# Patient Record
Sex: FEMALE | URBAN - METROPOLITAN AREA
[De-identification: names, ages, dates, MRNs, and addresses within clinical notes are randomized per-mention and may not be internally consistent; named-entity substitution may affect disease eponyms.]

---

## 2020-09-21 ENCOUNTER — OFFICE VISIT (OUTPATIENT)
Dept: URBAN - METROPOLITAN AREA CLINIC 115 | Facility: CLINIC | Age: 60
End: 2020-09-21

## 2020-10-07 ENCOUNTER — TELEPHONE ENCOUNTER (OUTPATIENT)
Dept: URBAN - METROPOLITAN AREA CLINIC 92 | Facility: CLINIC | Age: 60
End: 2020-10-07

## 2020-11-20 ENCOUNTER — LAB OUTSIDE AN ENCOUNTER (OUTPATIENT)
Dept: URBAN - METROPOLITAN AREA TELEHEALTH 2 | Facility: TELEHEALTH | Age: 60
End: 2020-11-20

## 2020-11-20 ENCOUNTER — OFFICE VISIT (OUTPATIENT)
Dept: URBAN - METROPOLITAN AREA TELEHEALTH 2 | Facility: TELEHEALTH | Age: 60
End: 2020-11-20
Payer: COMMERCIAL

## 2020-11-20 VITALS — HEIGHT: 61 IN | WEIGHT: 149 LBS | BODY MASS INDEX: 28.13 KG/M2

## 2020-11-20 DIAGNOSIS — K59.09 CHRONIC CONSTIPATION: ICD-10-CM

## 2020-11-20 DIAGNOSIS — R19.8 FULLNESS OF ABDOMEN: ICD-10-CM

## 2020-11-20 DIAGNOSIS — K83.8 DILATED BILE DUCT: ICD-10-CM

## 2020-11-20 DIAGNOSIS — R10.13 EPIGASTRIC ABDOMINAL PAIN: ICD-10-CM

## 2020-11-20 DIAGNOSIS — M54.5 LOW BACK PAIN: ICD-10-CM

## 2020-11-20 DIAGNOSIS — G89.29 OTHER CHRONIC PAIN: ICD-10-CM

## 2020-11-20 PROBLEM — 123608004: Status: ACTIVE | Noted: 2020-11-20

## 2020-11-20 PROBLEM — 82423001: Status: ACTIVE | Noted: 2020-11-20

## 2020-11-20 PROCEDURE — G8482 FLU IMMUNIZE ORDER/ADMIN: HCPCS | Performed by: INTERNAL MEDICINE

## 2020-11-20 PROCEDURE — 1036F TOBACCO NON-USER: CPT | Performed by: INTERNAL MEDICINE

## 2020-11-20 PROCEDURE — 3017F COLORECTAL CA SCREEN DOC REV: CPT | Performed by: INTERNAL MEDICINE

## 2020-11-20 PROCEDURE — 99214 OFFICE O/P EST MOD 30 MIN: CPT | Performed by: INTERNAL MEDICINE

## 2020-11-20 PROCEDURE — G9903 PT SCRN TBCO ID AS NON USER: HCPCS | Performed by: INTERNAL MEDICINE

## 2020-11-20 PROCEDURE — G8417 CALC BMI ABV UP PARAM F/U: HCPCS | Performed by: INTERNAL MEDICINE

## 2020-11-20 PROCEDURE — G8427 DOCREV CUR MEDS BY ELIG CLIN: HCPCS | Performed by: INTERNAL MEDICINE

## 2020-11-20 RX ORDER — GLIMEPIRIDE 2 MG/1
TABLET ORAL
Qty: 0 | Refills: 0 | Status: ON HOLD | COMMUNITY
Start: 1900-01-01

## 2020-11-20 RX ORDER — METFORMIN HYDROCHLORIDE 1000 MG/1
TABLET, COATED ORAL
Qty: 0 | Refills: 0 | Status: ACTIVE | COMMUNITY
Start: 1900-01-01

## 2020-11-20 RX ORDER — MONTELUKAST SODIUM 10 MG/1
TAKE 1 TABLET (10 MG) BY ORAL ROUTE ONCE DAILY IN THE EVENING TABLET, FILM COATED ORAL 1
Qty: 0 | Refills: 0 | Status: ACTIVE | COMMUNITY
Start: 1900-01-01

## 2020-11-20 RX ORDER — PANTOPRAZOLE SODIUM 40 MG/1
TABLET, DELAYED RELEASE ORAL
Qty: 0 | Refills: 0 | Status: ACTIVE | COMMUNITY
Start: 1900-01-01

## 2020-11-20 RX ORDER — BRIMONIDINE TARTRATE, TIMOLOL MALEATE 2; 5 MG/ML; MG/ML
SOLUTION/ DROPS OPHTHALMIC
Qty: 0 | Refills: 0 | Status: ACTIVE | COMMUNITY
Start: 1900-01-01

## 2020-11-20 RX ORDER — SERTRALINE 50 MG/1
TABLET, FILM COATED ORAL
Qty: 0 | Refills: 0 | Status: ACTIVE | COMMUNITY
Start: 1900-01-01

## 2020-11-20 RX ORDER — BIMATOPROST 0.1 MG/ML
SOLUTION/ DROPS OPHTHALMIC
Qty: 1 | Refills: 0 | Status: ACTIVE | COMMUNITY
Start: 1900-01-01

## 2020-11-20 RX ORDER — CARISOPRODOL 350 MG/1
TABLET ORAL
Qty: 0 | Refills: 0 | Status: ACTIVE | COMMUNITY
Start: 1900-01-01

## 2020-11-20 RX ORDER — AMBRISENTAN 5 MG/1
TABLET, FILM COATED ORAL
Qty: 0 | Refills: 0 | Status: ACTIVE | COMMUNITY
Start: 1900-01-01

## 2020-11-20 RX ORDER — PRAVASTATIN SODIUM 40 MG/1
TABLET ORAL
Qty: 0 | Refills: 0 | Status: ACTIVE | COMMUNITY
Start: 1900-01-01

## 2020-11-20 RX ORDER — LOSARTAN POTASSIUM 25 MG/1
TABLET, FILM COATED ORAL
Qty: 0 | Refills: 0 | Status: ON HOLD | COMMUNITY
Start: 1900-01-01

## 2020-11-20 RX ORDER — SILDENAFIL 25 MG/1
TABLET, FILM COATED ORAL
Qty: 0 | Refills: 0 | Status: ACTIVE | COMMUNITY
Start: 1900-01-01

## 2020-11-20 RX ORDER — PREDNISONE 5 MG/1
TABLET ORAL
Qty: 0 | Refills: 0 | Status: ACTIVE | COMMUNITY
Start: 1900-01-01

## 2020-11-20 RX ORDER — LINACLOTIDE 145 UG/1
1 CAPSULE AT LEAST 30 MINUTES BEFORE THE FIRST MEAL OF THE DAY ON AN EMPTY STOMACH CAPSULE, GELATIN COATED ORAL ONCE A DAY
Qty: 90 | Refills: 3 | OUTPATIENT

## 2020-11-20 NOTE — HPI-OTHER HISTORIES
59 y/o female presents for follow up of pressure at top of stomach, from naval up.  When moves or walks up stairs is very painful.  Passing gas and having BM.  Having loose stools daily.    Went to pulmonologist, he gave her probiotics and citracel.  This helped her move her bowels but epigastric pressure removes.  1.5 months ago went to urgent care, xray shows there was alot of stool.   Now moving bowels daily, but "diarrhea".  She notices pain when she walks up stairs. She is employed as a .  When she has to lift bags of dog food or other heavy items it will hurt her abdomen and back more.  Takes Soma for muscle spasms.  But lately her ack hurts as well.  Had CT 2 weeks ago, says her  said it was OK.  Did try LInzess a couple years ago, seem to recall it did help her.

## 2020-12-03 ENCOUNTER — OFFICE VISIT (OUTPATIENT)
Dept: URBAN - METROPOLITAN AREA SURGERY CENTER 13 | Facility: SURGERY CENTER | Age: 60
End: 2020-12-03
Payer: COMMERCIAL

## 2020-12-03 DIAGNOSIS — K29.60 ADENOPAPILLOMATOSIS GASTRICA: ICD-10-CM

## 2020-12-03 DIAGNOSIS — R10.13 ABDOMINAL DISCOMFORT, EPIGASTRIC: ICD-10-CM

## 2020-12-03 DIAGNOSIS — D13.1 ADENOMATOUS POLYP OF STOMACH: ICD-10-CM

## 2020-12-03 DIAGNOSIS — K29.80 ACUTE DUODENITIS: ICD-10-CM

## 2020-12-03 PROCEDURE — 43251 EGD REMOVE LESION SNARE: CPT | Performed by: INTERNAL MEDICINE

## 2020-12-03 PROCEDURE — 45378 DIAGNOSTIC COLONOSCOPY: CPT | Performed by: INTERNAL MEDICINE

## 2020-12-03 PROCEDURE — G9937 DIG OR SURV COLSCO: HCPCS | Performed by: INTERNAL MEDICINE

## 2020-12-03 PROCEDURE — G8907 PT DOC NO EVENTS ON DISCHARG: HCPCS | Performed by: INTERNAL MEDICINE

## 2020-12-03 PROCEDURE — 43239 EGD BIOPSY SINGLE/MULTIPLE: CPT | Performed by: INTERNAL MEDICINE

## 2020-12-08 ENCOUNTER — TELEPHONE ENCOUNTER (OUTPATIENT)
Dept: URBAN - METROPOLITAN AREA CLINIC 92 | Facility: CLINIC | Age: 60
End: 2020-12-08

## 2020-12-08 RX ORDER — SUCRALFATE 1 G/1
TAKE 1 TABLET BY ORAL ROUTE 4 TIMES A DAY FOR 30 DAYS TABLET ORAL QID
Qty: 112 TABLETS | Refills: 0 | OUTPATIENT
Start: 2020-12-08 | End: 2021-01-05

## 2020-12-08 RX ORDER — AMBRISENTAN 5 MG/1
TABLET, FILM COATED ORAL
Qty: 0 | Refills: 0 | Status: ACTIVE | COMMUNITY
Start: 1900-01-01

## 2020-12-08 RX ORDER — MONTELUKAST SODIUM 10 MG/1
TAKE 1 TABLET (10 MG) BY ORAL ROUTE ONCE DAILY IN THE EVENING TABLET, FILM COATED ORAL 1
Qty: 0 | Refills: 0 | Status: ACTIVE | COMMUNITY
Start: 1900-01-01

## 2020-12-08 RX ORDER — CARISOPRODOL 350 MG/1
TABLET ORAL
Qty: 0 | Refills: 0 | Status: ACTIVE | COMMUNITY
Start: 1900-01-01

## 2020-12-08 RX ORDER — PREDNISONE 5 MG/1
TABLET ORAL
Qty: 0 | Refills: 0 | Status: ACTIVE | COMMUNITY
Start: 1900-01-01

## 2020-12-08 RX ORDER — GLIMEPIRIDE 2 MG/1
TABLET ORAL
Qty: 0 | Refills: 0 | Status: ON HOLD | COMMUNITY
Start: 1900-01-01

## 2020-12-08 RX ORDER — METFORMIN HYDROCHLORIDE 1000 MG/1
TABLET, COATED ORAL
Qty: 0 | Refills: 0 | Status: ACTIVE | COMMUNITY
Start: 1900-01-01

## 2020-12-08 RX ORDER — LINACLOTIDE 145 UG/1
1 CAPSULE AT LEAST 30 MINUTES BEFORE THE FIRST MEAL OF THE DAY ON AN EMPTY STOMACH CAPSULE, GELATIN COATED ORAL ONCE A DAY
Qty: 90 | Refills: 3 | Status: ACTIVE | COMMUNITY

## 2020-12-08 RX ORDER — LOSARTAN POTASSIUM 25 MG/1
TABLET, FILM COATED ORAL
Qty: 0 | Refills: 0 | Status: ON HOLD | COMMUNITY
Start: 1900-01-01

## 2020-12-08 RX ORDER — BIMATOPROST 0.1 MG/ML
SOLUTION/ DROPS OPHTHALMIC
Qty: 1 | Refills: 0 | Status: ACTIVE | COMMUNITY
Start: 1900-01-01

## 2020-12-08 RX ORDER — LINACLOTIDE 145 UG/1
1 CAPSULE AT LEAST 30 MINUTES BEFORE THE FIRST MEAL OF THE DAY ON AN EMPTY STOMACH CAPSULE, GELATIN COATED ORAL ONCE A DAY
OUTPATIENT

## 2020-12-08 RX ORDER — BRIMONIDINE TARTRATE, TIMOLOL MALEATE 2; 5 MG/ML; MG/ML
SOLUTION/ DROPS OPHTHALMIC
Qty: 0 | Refills: 0 | Status: ACTIVE | COMMUNITY
Start: 1900-01-01

## 2020-12-08 RX ORDER — PANTOPRAZOLE SODIUM 40 MG/1
TABLET, DELAYED RELEASE ORAL
Qty: 0 | Refills: 0 | Status: ACTIVE | COMMUNITY
Start: 1900-01-01

## 2020-12-08 RX ORDER — PRAVASTATIN SODIUM 40 MG/1
TABLET ORAL
Qty: 0 | Refills: 0 | Status: ACTIVE | COMMUNITY
Start: 1900-01-01

## 2020-12-08 RX ORDER — SILDENAFIL 25 MG/1
TABLET, FILM COATED ORAL
Qty: 0 | Refills: 0 | Status: ACTIVE | COMMUNITY
Start: 1900-01-01

## 2020-12-08 RX ORDER — SERTRALINE 50 MG/1
TABLET, FILM COATED ORAL
Qty: 0 | Refills: 0 | Status: ACTIVE | COMMUNITY
Start: 1900-01-01

## 2020-12-31 ENCOUNTER — LAB OUTSIDE AN ENCOUNTER (OUTPATIENT)
Dept: URBAN - METROPOLITAN AREA CLINIC 92 | Facility: CLINIC | Age: 60
End: 2020-12-31

## 2020-12-31 ENCOUNTER — TELEPHONE ENCOUNTER (OUTPATIENT)
Dept: URBAN - METROPOLITAN AREA CLINIC 92 | Facility: CLINIC | Age: 60
End: 2020-12-31

## 2021-01-22 ENCOUNTER — TELEPHONE ENCOUNTER (OUTPATIENT)
Dept: URBAN - METROPOLITAN AREA CLINIC 82 | Facility: CLINIC | Age: 61
End: 2021-01-22

## 2021-01-22 RX ORDER — SUCRALFATE 1 G/1
TAKE 1 TABLET BY ORAL ROUTE 4 TIMES A DAY FOR 30 DAYS TABLET ORAL QID
Qty: 112 TABLETS
Start: 2020-12-08

## 2022-01-28 ENCOUNTER — OFFICE VISIT (OUTPATIENT)
Dept: URBAN - METROPOLITAN AREA TELEHEALTH 2 | Facility: TELEHEALTH | Age: 62
End: 2022-01-28

## 2022-03-28 ENCOUNTER — OFFICE VISIT (OUTPATIENT)
Dept: URBAN - METROPOLITAN AREA CLINIC 115 | Facility: CLINIC | Age: 62
End: 2022-03-28

## 2022-05-25 ENCOUNTER — OFFICE VISIT (OUTPATIENT)
Dept: URBAN - METROPOLITAN AREA CLINIC 82 | Facility: CLINIC | Age: 62
End: 2022-05-25

## 2022-05-25 VITALS — HEIGHT: 61 IN

## 2022-12-30 ENCOUNTER — OFFICE VISIT (OUTPATIENT)
Dept: URBAN - METROPOLITAN AREA CLINIC 82 | Facility: CLINIC | Age: 62
End: 2022-12-30

## 2022-12-30 RX ORDER — SERTRALINE 50 MG/1
TABLET, FILM COATED ORAL
Qty: 0 | Refills: 0 | Status: ACTIVE | COMMUNITY
Start: 1900-01-01

## 2022-12-30 RX ORDER — MONTELUKAST SODIUM 10 MG/1
TAKE 1 TABLET (10 MG) BY ORAL ROUTE ONCE DAILY IN THE EVENING TABLET, FILM COATED ORAL 1
Qty: 0 | Refills: 0 | Status: ACTIVE | COMMUNITY
Start: 1900-01-01

## 2022-12-30 RX ORDER — PRAVASTATIN SODIUM 40 MG/1
TABLET ORAL
Qty: 0 | Refills: 0 | Status: ACTIVE | COMMUNITY
Start: 1900-01-01

## 2022-12-30 RX ORDER — PANTOPRAZOLE SODIUM 40 MG/1
TABLET, DELAYED RELEASE ORAL
Qty: 0 | Refills: 0 | Status: ACTIVE | COMMUNITY
Start: 1900-01-01

## 2022-12-30 RX ORDER — SILDENAFIL 25 MG/1
TABLET, FILM COATED ORAL
Qty: 0 | Refills: 0 | Status: ACTIVE | COMMUNITY
Start: 1900-01-01

## 2022-12-30 RX ORDER — SUCRALFATE 1 G/1
TAKE 1 TABLET BY ORAL ROUTE 4 TIMES A DAY FOR 30 DAYS TABLET ORAL QID
Qty: 112 TABLETS | Status: ACTIVE | COMMUNITY
Start: 2020-12-08

## 2022-12-30 RX ORDER — GLIMEPIRIDE 2 MG/1
TABLET ORAL
Qty: 0 | Refills: 0 | Status: ON HOLD | COMMUNITY
Start: 1900-01-01

## 2022-12-30 RX ORDER — METFORMIN HYDROCHLORIDE 1000 MG/1
TABLET, COATED ORAL
Qty: 0 | Refills: 0 | Status: ACTIVE | COMMUNITY
Start: 1900-01-01

## 2022-12-30 RX ORDER — BRIMONIDINE TARTRATE, TIMOLOL MALEATE 2; 5 MG/ML; MG/ML
SOLUTION/ DROPS OPHTHALMIC
Qty: 0 | Refills: 0 | Status: ACTIVE | COMMUNITY
Start: 1900-01-01

## 2022-12-30 RX ORDER — CARISOPRODOL 350 MG/1
TABLET ORAL
Qty: 0 | Refills: 0 | Status: ACTIVE | COMMUNITY
Start: 1900-01-01

## 2022-12-30 RX ORDER — AMBRISENTAN 5 MG/1
TABLET, FILM COATED ORAL
Qty: 0 | Refills: 0 | Status: ACTIVE | COMMUNITY
Start: 1900-01-01

## 2022-12-30 RX ORDER — LOSARTAN POTASSIUM 25 MG/1
TABLET, FILM COATED ORAL
Qty: 0 | Refills: 0 | Status: ON HOLD | COMMUNITY
Start: 1900-01-01

## 2022-12-30 RX ORDER — BIMATOPROST 0.1 MG/ML
SOLUTION/ DROPS OPHTHALMIC
Qty: 1 | Refills: 0 | Status: ACTIVE | COMMUNITY
Start: 1900-01-01

## 2022-12-30 RX ORDER — PREDNISONE 5 MG/1
TABLET ORAL
Qty: 0 | Refills: 0 | Status: ACTIVE | COMMUNITY
Start: 1900-01-01

## 2023-12-13 ENCOUNTER — LAB OUTSIDE AN ENCOUNTER (OUTPATIENT)
Dept: URBAN - METROPOLITAN AREA CLINIC 115 | Facility: CLINIC | Age: 63
End: 2023-12-13

## 2023-12-13 ENCOUNTER — OFFICE VISIT (OUTPATIENT)
Dept: URBAN - METROPOLITAN AREA CLINIC 115 | Facility: CLINIC | Age: 63
End: 2023-12-13
Payer: COMMERCIAL

## 2023-12-13 ENCOUNTER — WEB ENCOUNTER (OUTPATIENT)
Dept: URBAN - METROPOLITAN AREA CLINIC 92 | Facility: CLINIC | Age: 63
End: 2023-12-13

## 2023-12-13 VITALS
HEART RATE: 114 BPM | HEIGHT: 61 IN | TEMPERATURE: 98 F | WEIGHT: 102.8 LBS | SYSTOLIC BLOOD PRESSURE: 143 MMHG | DIASTOLIC BLOOD PRESSURE: 84 MMHG | BODY MASS INDEX: 19.41 KG/M2

## 2023-12-13 DIAGNOSIS — K76.0 METABOLIC DYSFUNCTION-ASSOCIATED STEATOTIC LIVER DISEASE (MASLD): ICD-10-CM

## 2023-12-13 DIAGNOSIS — K21.9 GASTROESOPHAGEAL REFLUX DISEASE WITHOUT ESOPHAGITIS: ICD-10-CM

## 2023-12-13 DIAGNOSIS — Z87.898 HISTORY OF HEMOPTYSIS: ICD-10-CM

## 2023-12-13 DIAGNOSIS — R00.0 TACHYCARDIA: ICD-10-CM

## 2023-12-13 DIAGNOSIS — R63.4 WEIGHT LOSS, UNINTENTIONAL: ICD-10-CM

## 2023-12-13 DIAGNOSIS — R10.13 EPIGASTRIC ABDOMINAL PAIN: ICD-10-CM

## 2023-12-13 DIAGNOSIS — K86.89 DILATED PANCREATIC DUCT: ICD-10-CM

## 2023-12-13 PROBLEM — 266435005: Status: ACTIVE | Noted: 2023-12-13

## 2023-12-13 PROBLEM — 197321007: Status: ACTIVE | Noted: 2023-12-13

## 2023-12-13 PROCEDURE — 99204 OFFICE O/P NEW MOD 45 MIN: CPT

## 2023-12-13 RX ORDER — PANTOPRAZOLE SODIUM 40 MG/1
TABLET, DELAYED RELEASE ORAL
Qty: 0 | Refills: 0 | Status: DISCONTINUED | COMMUNITY
Start: 1900-01-01

## 2023-12-13 RX ORDER — SILDENAFIL 25 MG/1
TABLET, FILM COATED ORAL
Qty: 0 | Refills: 0 | Status: DISCONTINUED | COMMUNITY
Start: 1900-01-01

## 2023-12-13 RX ORDER — SILDENAFIL 20 MG/1
1 TABLET TABLET, FILM COATED ORAL ONCE A DAY
Status: ACTIVE | COMMUNITY

## 2023-12-13 RX ORDER — PRAVASTATIN SODIUM 40 MG/1
1 TABLET TABLET ORAL ONCE A DAY
Status: ACTIVE | COMMUNITY

## 2023-12-13 RX ORDER — PREDNISONE 5 MG/1
TABLET ORAL
Qty: 0 | Refills: 0 | Status: DISCONTINUED | COMMUNITY
Start: 1900-01-01

## 2023-12-13 RX ORDER — GLIMEPIRIDE 2 MG/1
TABLET ORAL
Qty: 0 | Refills: 0 | Status: ON HOLD | COMMUNITY
Start: 1900-01-01

## 2023-12-13 RX ORDER — BIMATOPROST 0.1 MG/ML
1 DROP INTO AFFECTED EYE IN THE EVENING SOLUTION/ DROPS OPHTHALMIC ONCE A DAY
Status: ACTIVE | COMMUNITY

## 2023-12-13 RX ORDER — CARISOPRODOL 350 MG/1
TABLET ORAL
Qty: 0 | Refills: 0 | Status: DISCONTINUED | COMMUNITY
Start: 1900-01-01

## 2023-12-13 RX ORDER — SUCRALFATE 1 G/1
TAKE 1 TABLET BY ORAL ROUTE 4 TIMES A DAY FOR 30 DAYS TABLET ORAL QID
Qty: 112 TABLETS | Status: DISCONTINUED | COMMUNITY
Start: 2020-12-08

## 2023-12-13 RX ORDER — PRAVASTATIN SODIUM 40 MG/1
TABLET ORAL
Qty: 0 | Refills: 0 | Status: DISCONTINUED | COMMUNITY
Start: 1900-01-01

## 2023-12-13 RX ORDER — METFORMIN HYDROCHLORIDE 1000 MG/1
TABLET, COATED ORAL
Qty: 0 | Refills: 0 | Status: DISCONTINUED | COMMUNITY
Start: 1900-01-01

## 2023-12-13 RX ORDER — FLUTICASONE FUROATE AND VILANTEROL TRIFENATATE 200; 25 UG/1; UG/1
1 PUFF POWDER RESPIRATORY (INHALATION) ONCE A DAY
Status: ACTIVE | COMMUNITY

## 2023-12-13 RX ORDER — SITAGLIPTIN 100 MG/1
1 TABLET TABLET, FILM COATED ORAL ONCE A DAY
Status: ACTIVE | COMMUNITY

## 2023-12-13 RX ORDER — PREDNISONE 10 MG/1
1 TABLET TABLET ORAL ONCE A DAY
Status: ACTIVE | COMMUNITY

## 2023-12-13 RX ORDER — AMBRISENTAN 5 MG/1
TABLET, FILM COATED ORAL
Qty: 0 | Refills: 0 | Status: DISCONTINUED | COMMUNITY
Start: 1900-01-01

## 2023-12-13 RX ORDER — BRIMONIDINE TARTRATE, TIMOLOL MALEATE 2; 5 MG/ML; MG/ML
SOLUTION/ DROPS OPHTHALMIC
Qty: 0 | Refills: 0 | Status: DISCONTINUED | COMMUNITY
Start: 1900-01-01

## 2023-12-13 RX ORDER — MONTELUKAST SODIUM 10 MG/1
1 TABLET TABLET, FILM COATED ORAL ONCE A DAY
Status: ACTIVE | COMMUNITY

## 2023-12-13 RX ORDER — NEBIVOLOL HYDROCHLORIDE 5 MG/1
1 TABLET TABLET ORAL ONCE A DAY
Status: ACTIVE | COMMUNITY

## 2023-12-13 RX ORDER — IPRATROPIUM BROMIDE AND ALBUTEROL SULFATE .5; 2.5 MG/3ML; MG/3ML
3 ML AS NEEDED SOLUTION RESPIRATORY (INHALATION)
Status: ACTIVE | COMMUNITY

## 2023-12-13 RX ORDER — BRIMONIDINE TARTRATE, TIMOLOL MALEATE 2; 5 MG/ML; MG/ML
1 DROP INTO AFFECTED EYE SOLUTION/ DROPS OPHTHALMIC TWICE A DAY
Status: ACTIVE | COMMUNITY

## 2023-12-13 RX ORDER — BIMATOPROST 0.1 MG/ML
SOLUTION/ DROPS OPHTHALMIC
Qty: 1 | Refills: 0 | Status: DISCONTINUED | COMMUNITY
Start: 1900-01-01

## 2023-12-13 RX ORDER — MONTELUKAST SODIUM 10 MG/1
TAKE 1 TABLET (10 MG) BY ORAL ROUTE ONCE DAILY IN THE EVENING TABLET, FILM COATED ORAL 1
Qty: 0 | Refills: 0 | Status: DISCONTINUED | COMMUNITY
Start: 1900-01-01

## 2023-12-13 RX ORDER — MACITENTAN 10 MG/1
1 TABLET TABLET, FILM COATED ORAL ONCE A DAY
Status: ACTIVE | COMMUNITY

## 2023-12-13 RX ORDER — SERTRALINE 50 MG/1
TABLET, FILM COATED ORAL
Qty: 0 | Refills: 0 | Status: DISCONTINUED | COMMUNITY
Start: 1900-01-01

## 2023-12-13 RX ORDER — LOSARTAN POTASSIUM 25 MG/1
TABLET, FILM COATED ORAL
Qty: 0 | Refills: 0 | Status: ON HOLD | COMMUNITY
Start: 1900-01-01

## 2023-12-13 RX ORDER — CARISOPRODOL 350 MG/1
1 TABLET AS NEEDED TABLET ORAL
Status: ACTIVE | COMMUNITY

## 2023-12-13 NOTE — HPI-TODAY'S VISIT:
64 y/o F with PMH of pulmnary sarcoidosis (Dr. Jorge Luis Reina), aspergilloma, chronic bronchiectasis, chronic hypoxic respiratory failure, severe pulmonary HTN on 2L home O2 PRN presenting in clinic for evaluation of dilated pancreatic duct.  11/2020 CT Abdomen showed: hepatic steatosis, mild distention of the extrahepatic duct, distention of the pancreatic duct likely secondary to prior cholecystectomy. Admits epigastric abdominal pain that relieves with belching/passing gas. Reports bloating, occasional nausea. Was given dicyclomine 20mg to help relieve pain and  Protonix 40mg qd to BID with releif and got CT done in 4/2023 with [facility] (Dr. Coty Ng) that showed the dilated duct again, recommended to get EUS.Reports 8 lbs unintentional weight loss in 2 months, occasional early satiety and night sweats. Denies diet changes, jaundice, icterus, anorexia, vomiting, steatorrhea, blood in stool. Reports occasional constipation but is fairly regular; fluctuates between soft and clumpy formed stools.  States she went to the ER 1 month ago for hematemesis, was worked up and concluded this was due to her lung issues; brochoscopy scheduled tomorrow to assess for cause of bleeding wih Dr. Vidal Lee.  Last colonoscopy: 2020 with Dr. Kennedy showed diverticula and internal hemorrhoids. Next due 2030. 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 Labs in 2020 had ALT 61, AST 51, alkP 95. 2023 labs did not assess LFTs.

## 2023-12-15 LAB
ABSOLUTE BASOPHILS: 28
ABSOLUTE EOSINOPHILS: 202
ABSOLUTE LYMPHOCYTES: 1131
ABSOLUTE MONOCYTES: 756
ABSOLUTE NEUTROPHILS: 3483
ACTIN (SMOOTH MUSCLE) ANTIBODY (IGG): <20
ALBUMIN/GLOBULIN RATIO: 1.1
ALBUMIN: 3.5
ALKALINE PHOSPHATASE: 56
ALT: 8
AST: 13
BASOPHILS: 0.5
BILIRUBIN, TOTAL: 0.4
BUN/CREATININE RATIO: (no result)
CALCIUM: 8.8
CARBON DIOXIDE: 29
CHLORIDE: 107
CREATININE: 0.7
EGFR: 98
EOSINOPHILS: 3.6
FERRITIN, SERUM: 8
FIB 4 INDEX: 1.05
FIB 4 INTERPRETATION: (no result)
GLOBULIN: 3.1
GLUCOSE: 82
HBSAG SCREEN: (no result)
HEMATOCRIT: 28.8
HEMOGLOBIN: 9.1
HEP A AB, IGM: (no result)
HEP B CORE AB, IGM: (no result)
HEPATITIS C ANTIBODY: (no result)
INR: 1
IRON BIND.CAP.(TIBC): 318
IRON SATURATION: 12
IRON: 39
LYMPHOCYTES: 20.2
MCH: 27.8
MCHC: 31.6
MCV: 88.1
MITOCHONDRIAL (M2) ANTIBODY: <=20
MONOCYTES: 13.5
MPV: 9.8
NEUTROPHILS: 62.2
PLATELET COUNT: 271
PLATELET COUNT: 271
POTASSIUM: 3.8
PROTEIN, TOTAL: 6.6
PT: 10.6
RDW: 13.9
RED BLOOD CELL COUNT: 3.27
SODIUM: 144
UREA NITROGEN (BUN): 10
WHITE BLOOD CELL COUNT: 5.6

## 2023-12-18 ENCOUNTER — TELEPHONE ENCOUNTER (OUTPATIENT)
Dept: URBAN - METROPOLITAN AREA CLINIC 115 | Facility: CLINIC | Age: 63
End: 2023-12-18

## 2023-12-18 PROBLEM — 87522002: Status: ACTIVE | Noted: 2023-12-18

## 2023-12-18 RX ORDER — FERROUS SULFATE 325(65) MG
1 TABLET TABLET ORAL ONCE A DAY
Qty: 30 TABLET | Refills: 3 | OUTPATIENT
Start: 2023-12-18

## 2023-12-26 ENCOUNTER — TELEPHONE ENCOUNTER (OUTPATIENT)
Dept: URBAN - METROPOLITAN AREA CLINIC 115 | Facility: CLINIC | Age: 63
End: 2023-12-26

## 2023-12-29 ENCOUNTER — TELEPHONE ENCOUNTER (OUTPATIENT)
Dept: URBAN - METROPOLITAN AREA CLINIC 82 | Facility: CLINIC | Age: 63
End: 2023-12-29

## 2024-01-03 ENCOUNTER — OFFICE VISIT (OUTPATIENT)
Dept: URBAN - METROPOLITAN AREA CLINIC 82 | Facility: CLINIC | Age: 64
End: 2024-01-03

## 2024-01-08 ENCOUNTER — TELEPHONE ENCOUNTER (OUTPATIENT)
Dept: URBAN - METROPOLITAN AREA CLINIC 115 | Facility: CLINIC | Age: 64
End: 2024-01-08

## 2024-01-23 ENCOUNTER — OFFICE VISIT (OUTPATIENT)
Dept: URBAN - METROPOLITAN AREA CLINIC 115 | Facility: CLINIC | Age: 64
End: 2024-01-23
Payer: COMMERCIAL

## 2024-01-23 ENCOUNTER — OFFICE VISIT (OUTPATIENT)
Dept: URBAN - METROPOLITAN AREA CLINIC 115 | Facility: CLINIC | Age: 64
End: 2024-01-23

## 2024-01-23 VITALS
DIASTOLIC BLOOD PRESSURE: 73 MMHG | SYSTOLIC BLOOD PRESSURE: 121 MMHG | HEIGHT: 61 IN | HEART RATE: 103 BPM | TEMPERATURE: 97.8 F | BODY MASS INDEX: 19.48 KG/M2 | WEIGHT: 103.2 LBS

## 2024-01-23 DIAGNOSIS — D50.9 IRON DEFICIENCY ANEMIA, UNSPECIFIED IRON DEFICIENCY ANEMIA TYPE: ICD-10-CM

## 2024-01-23 DIAGNOSIS — R13.19 OTHER DYSPHAGIA: ICD-10-CM

## 2024-01-23 DIAGNOSIS — K21.9 GERD: ICD-10-CM

## 2024-01-23 PROCEDURE — 99213 OFFICE O/P EST LOW 20 MIN: CPT

## 2024-01-23 RX ORDER — CARISOPRODOL 350 MG/1
1 TABLET AS NEEDED TABLET ORAL
Status: ACTIVE | COMMUNITY

## 2024-01-23 RX ORDER — SILDENAFIL 20 MG/1
1 TABLET TABLET, FILM COATED ORAL ONCE A DAY
Status: ACTIVE | COMMUNITY

## 2024-01-23 RX ORDER — MONTELUKAST SODIUM 10 MG/1
1 TABLET TABLET, FILM COATED ORAL ONCE A DAY
Status: ACTIVE | COMMUNITY

## 2024-01-23 RX ORDER — BRIMONIDINE TARTRATE, TIMOLOL MALEATE 2; 5 MG/ML; MG/ML
1 DROP INTO AFFECTED EYE SOLUTION/ DROPS OPHTHALMIC TWICE A DAY
Status: ACTIVE | COMMUNITY

## 2024-01-23 RX ORDER — GLIMEPIRIDE 2 MG/1
TABLET ORAL
Qty: 0 | Refills: 0 | Status: ON HOLD | COMMUNITY
Start: 1900-01-01

## 2024-01-23 RX ORDER — PRAVASTATIN SODIUM 40 MG/1
1 TABLET TABLET ORAL ONCE A DAY
Status: ACTIVE | COMMUNITY

## 2024-01-23 RX ORDER — DICYCLOMINE HYDROCHLORIDE 20 MG/1
1 TABLET TABLET ORAL THREE TIMES A DAY
Status: ON HOLD | COMMUNITY

## 2024-01-23 RX ORDER — LOSARTAN POTASSIUM 25 MG/1
TABLET, FILM COATED ORAL
Qty: 0 | Refills: 0 | Status: ON HOLD | COMMUNITY
Start: 1900-01-01

## 2024-01-23 RX ORDER — BIMATOPROST 0.1 MG/ML
1 DROP INTO AFFECTED EYE IN THE EVENING SOLUTION/ DROPS OPHTHALMIC ONCE A DAY
Status: ACTIVE | COMMUNITY

## 2024-01-23 RX ORDER — MACITENTAN 10 MG/1
1 TABLET TABLET, FILM COATED ORAL ONCE A DAY
Status: ACTIVE | COMMUNITY

## 2024-01-23 RX ORDER — PANTOPRAZOLE SODIUM 40 MG/1
1 TABLET TABLET, DELAYED RELEASE ORAL TWICE A DAY
Qty: 60 TABLET | Refills: 0 | OUTPATIENT
Start: 2024-01-23

## 2024-01-23 RX ORDER — IPRATROPIUM BROMIDE AND ALBUTEROL SULFATE .5; 2.5 MG/3ML; MG/3ML
3 ML AS NEEDED SOLUTION RESPIRATORY (INHALATION)
Status: ACTIVE | COMMUNITY

## 2024-01-23 RX ORDER — FERROUS SULFATE 325(65) MG
1 TABLET TABLET ORAL ONCE A DAY
Qty: 30 TABLET | Refills: 3 | Status: ACTIVE | COMMUNITY
Start: 2023-12-18

## 2024-01-23 RX ORDER — NEBIVOLOL HYDROCHLORIDE 5 MG/1
1 TABLET TABLET ORAL ONCE A DAY
Status: ACTIVE | COMMUNITY

## 2024-01-23 RX ORDER — FLUTICASONE FUROATE AND VILANTEROL TRIFENATATE 200; 25 UG/1; UG/1
1 PUFF POWDER RESPIRATORY (INHALATION) ONCE A DAY
Status: ACTIVE | COMMUNITY

## 2024-01-23 RX ORDER — SITAGLIPTIN 100 MG/1
1 TABLET TABLET, FILM COATED ORAL ONCE A DAY
Status: ACTIVE | COMMUNITY

## 2024-01-23 RX ORDER — PREDNISONE 10 MG/1
1 TABLET TABLET ORAL ONCE A DAY
Status: ACTIVE | COMMUNITY

## 2024-01-23 NOTE — HPI-TODAY'S VISIT:
62 y/o F with PMH of pulmnary sarcoidosis (Dr. Jorge Luis Reina), aspergilloma, chronic bronchiectasis, chronic hypoxic respiratory failure, severe pulmonary HTN on 2L home O2 PRN presenting in clinic for evaluation of dilated pancreatic duct.  11/2020 CT Abdomen showed: hepatic steatosis, mild distention of the extrahepatic duct, distention of the pancreatic duct likely secondary to prior cholecystectomy. Admits epigastric abdominal pain that relieves with belching/passing gas. Reports bloating, occasional nausea. Was given dicyclomine 20mg to help relieve pain and  Protonix 40mg qd to BID with releif and got CT done in 4/2023 with [facility] (Dr. Coty Ng) that showed the dilated duct again, recommended to get EUS.Reports 8 lbs unintentional weight loss in 2 months, occasional early satiety and night sweats. Denies diet changes, jaundice, icterus, anorexia, vomiting, steatorrhea, blood in stool. Reports occasional constipation but is fairly regular; fluctuates between soft and clumpy formed stools. States she went to the ER 1 month ago for hematemesis, was worked up and concluded this was due to her lung issues; brochoscopy scheduled tomorrow to assess for cause of bleeding wih Dr. Vidal Lee. Last colonoscopy: 2020 with Dr. Kennedy showed diverticula and internal hemorrhoids. Next due 2030. 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 Labs in 2020 had ALT 61, AST 51, alkP 95. 2023 labs did not assess LFTs. 1/22/24 today visit: Pt presenting for f/u. EUS scheduled for February. At last visit, increased pantoprazole 40mg from qd to BID. Labs for MASLD done only showed iron deficiency anemia; started on iron tablets and MiraLax. Denies constipation. States the bronchoscopy was negative. States the dicyclomine helped with the abdominal pain. States food takes a while to go down sometimes; noticed specifically with grits, mashed potatoes, meats. Bloating, nausea, belching has gotten better.

## 2024-01-24 ENCOUNTER — TELEPHONE ENCOUNTER (OUTPATIENT)
Dept: URBAN - METROPOLITAN AREA CLINIC 115 | Facility: CLINIC | Age: 64
End: 2024-01-24

## 2024-01-24 LAB
FERRITIN, SERUM: 18
HEMATOCRIT: 29.8
HEMOGLOBIN: 9.5
IRON BIND.CAP.(TIBC): 289
IRON SATURATION: 13
IRON: 39
MCH: 28.5
MCHC: 31.9
MCV: 89.5
MPV: 10.6
PLATELET COUNT: 238
RDW: 17.5
RED BLOOD CELL COUNT: 3.33
WHITE BLOOD CELL COUNT: 6.7

## 2024-01-24 RX ORDER — FERROUS FUMARATE 456(150)MG
1 TABLET TABLET ORAL EVERY OTHER DAY
Qty: 15 | Refills: 1 | OUTPATIENT
Start: 2024-01-24

## 2024-01-24 RX ORDER — FERROUS SULFATE 325(65) MG
1 TABLET TABLET ORAL ONCE A DAY
OUTPATIENT
Start: 2023-12-18

## 2024-03-05 ENCOUNTER — OV EP (OUTPATIENT)
Dept: URBAN - METROPOLITAN AREA CLINIC 115 | Facility: CLINIC | Age: 64
End: 2024-03-05

## 2024-04-05 ENCOUNTER — OV EP (OUTPATIENT)
Dept: URBAN - METROPOLITAN AREA CLINIC 115 | Facility: CLINIC | Age: 64
End: 2024-04-05
Payer: COMMERCIAL

## 2024-04-05 ENCOUNTER — LAB (OUTPATIENT)
Dept: URBAN - METROPOLITAN AREA CLINIC 115 | Facility: CLINIC | Age: 64
End: 2024-04-05

## 2024-04-05 VITALS
HEART RATE: 109 BPM | WEIGHT: 108 LBS | BODY MASS INDEX: 20.39 KG/M2 | TEMPERATURE: 97.7 F | HEIGHT: 61 IN | SYSTOLIC BLOOD PRESSURE: 139 MMHG | DIASTOLIC BLOOD PRESSURE: 86 MMHG

## 2024-04-05 DIAGNOSIS — G89.29 OTHER CHRONIC PAIN: ICD-10-CM

## 2024-04-05 DIAGNOSIS — K86.89 DILATED PANCREATIC DUCT: ICD-10-CM

## 2024-04-05 DIAGNOSIS — R10.13 EPIGASTRIC ABDOMINAL PAIN: ICD-10-CM

## 2024-04-05 DIAGNOSIS — R19.8 FULLNESS OF ABDOMEN: ICD-10-CM

## 2024-04-05 PROCEDURE — 99214 OFFICE O/P EST MOD 30 MIN: CPT | Performed by: INTERNAL MEDICINE

## 2024-04-05 RX ORDER — PREDNISONE 10 MG/1
1 TABLET TABLET ORAL ONCE A DAY
Status: ACTIVE | COMMUNITY

## 2024-04-05 RX ORDER — MONTELUKAST SODIUM 10 MG/1
1 TABLET TABLET, FILM COATED ORAL ONCE A DAY
Status: ACTIVE | COMMUNITY

## 2024-04-05 RX ORDER — PRAVASTATIN SODIUM 40 MG/1
1 TABLET TABLET ORAL ONCE A DAY
Status: ACTIVE | COMMUNITY

## 2024-04-05 RX ORDER — MACITENTAN 10 MG/1
1 TABLET TABLET, FILM COATED ORAL ONCE A DAY
Status: ACTIVE | COMMUNITY

## 2024-04-05 RX ORDER — NEBIVOLOL HYDROCHLORIDE 5 MG/1
1 TABLET TABLET ORAL ONCE A DAY
Status: ACTIVE | COMMUNITY

## 2024-04-05 RX ORDER — BIMATOPROST 0.1 MG/ML
1 DROP INTO AFFECTED EYE IN THE EVENING SOLUTION/ DROPS OPHTHALMIC ONCE A DAY
Status: ACTIVE | COMMUNITY

## 2024-04-05 RX ORDER — FERROUS FUMARATE 456(150)MG
1 TABLET TABLET ORAL EVERY OTHER DAY
Qty: 15 | Refills: 1 | Status: ACTIVE | COMMUNITY
Start: 2024-01-24

## 2024-04-05 RX ORDER — PANTOPRAZOLE SODIUM 40 MG/1
1 TABLET TABLET, DELAYED RELEASE ORAL TWICE A DAY
Qty: 60 TABLET | Refills: 0 | Status: ACTIVE | COMMUNITY
Start: 2024-01-23

## 2024-04-05 RX ORDER — IPRATROPIUM BROMIDE AND ALBUTEROL SULFATE .5; 2.5 MG/3ML; MG/3ML
3 ML AS NEEDED SOLUTION RESPIRATORY (INHALATION)
Status: ACTIVE | COMMUNITY

## 2024-04-05 RX ORDER — SILDENAFIL 20 MG/1
1 TABLET TABLET, FILM COATED ORAL ONCE A DAY
Status: ACTIVE | COMMUNITY

## 2024-04-05 RX ORDER — LOSARTAN POTASSIUM 25 MG/1
TABLET, FILM COATED ORAL
Qty: 0 | Refills: 0 | Status: ON HOLD | COMMUNITY
Start: 1900-01-01

## 2024-04-05 RX ORDER — BRIMONIDINE TARTRATE, TIMOLOL MALEATE 2; 5 MG/ML; MG/ML
1 DROP INTO AFFECTED EYE SOLUTION/ DROPS OPHTHALMIC TWICE A DAY
Status: ACTIVE | COMMUNITY

## 2024-04-05 RX ORDER — FLUTICASONE FUROATE AND VILANTEROL TRIFENATATE 200; 25 UG/1; UG/1
1 PUFF POWDER RESPIRATORY (INHALATION) ONCE A DAY
Status: ACTIVE | COMMUNITY

## 2024-04-05 RX ORDER — SITAGLIPTIN 100 MG/1
1 TABLET TABLET, FILM COATED ORAL ONCE A DAY
Status: ACTIVE | COMMUNITY

## 2024-04-05 RX ORDER — GLIMEPIRIDE 2 MG/1
TABLET ORAL
Qty: 0 | Refills: 0 | Status: ON HOLD | COMMUNITY
Start: 1900-01-01

## 2024-04-05 RX ORDER — DICYCLOMINE HYDROCHLORIDE 20 MG/1
1 TABLET 30 MINUTES BEFORE MEALS TABLET ORAL THREE TIMES A DAY
Qty: 90 | Refills: 3 | Status: ACTIVE | COMMUNITY

## 2024-04-05 RX ORDER — CARISOPRODOL 350 MG/1
1 TABLET AS NEEDED TABLET ORAL
Status: ACTIVE | COMMUNITY

## 2024-04-05 NOTE — PHYSICAL EXAM GASTROINTESTINAL
Abdomen , soft, epigastric tenderness, nondistended , no guarding or rigidity , no masses palpable , normal bowel sounds , Liver and Spleen , no hepatosplenomegaly

## 2024-04-09 ENCOUNTER — EUS (OUTPATIENT)
Dept: URBAN - METROPOLITAN AREA MEDICAL CENTER 24 | Facility: MEDICAL CENTER | Age: 64
End: 2024-04-09

## 2024-04-29 ENCOUNTER — OV EP (OUTPATIENT)
Dept: URBAN - METROPOLITAN AREA CLINIC 115 | Facility: CLINIC | Age: 64
End: 2024-04-29

## 2024-04-29 RX ORDER — SITAGLIPTIN 100 MG/1
1 TABLET TABLET, FILM COATED ORAL ONCE A DAY
Status: ACTIVE | COMMUNITY

## 2024-04-29 RX ORDER — PREDNISONE 10 MG/1
1 TABLET TABLET ORAL ONCE A DAY
Status: ACTIVE | COMMUNITY

## 2024-04-29 RX ORDER — PRAVASTATIN SODIUM 40 MG/1
1 TABLET TABLET ORAL ONCE A DAY
Status: ACTIVE | COMMUNITY

## 2024-04-29 RX ORDER — CARISOPRODOL 350 MG/1
1 TABLET AS NEEDED TABLET ORAL
Status: ACTIVE | COMMUNITY

## 2024-04-29 RX ORDER — LOSARTAN POTASSIUM 25 MG/1
TABLET, FILM COATED ORAL
Qty: 0 | Refills: 0 | Status: ON HOLD | COMMUNITY
Start: 1900-01-01

## 2024-04-29 RX ORDER — SILDENAFIL 20 MG/1
1 TABLET TABLET, FILM COATED ORAL ONCE A DAY
Status: ACTIVE | COMMUNITY

## 2024-04-29 RX ORDER — BRIMONIDINE TARTRATE, TIMOLOL MALEATE 2; 5 MG/ML; MG/ML
1 DROP INTO AFFECTED EYE SOLUTION/ DROPS OPHTHALMIC TWICE A DAY
Status: ACTIVE | COMMUNITY

## 2024-04-29 RX ORDER — IPRATROPIUM BROMIDE AND ALBUTEROL SULFATE .5; 2.5 MG/3ML; MG/3ML
3 ML AS NEEDED SOLUTION RESPIRATORY (INHALATION)
Status: ACTIVE | COMMUNITY

## 2024-04-29 RX ORDER — PANTOPRAZOLE SODIUM 40 MG/1
1 TABLET TABLET, DELAYED RELEASE ORAL TWICE A DAY
Qty: 60 TABLET | Refills: 0 | Status: ACTIVE | COMMUNITY
Start: 2024-01-23

## 2024-04-29 RX ORDER — GLIMEPIRIDE 2 MG/1
TABLET ORAL
Qty: 0 | Refills: 0 | Status: ON HOLD | COMMUNITY
Start: 1900-01-01

## 2024-04-29 RX ORDER — BIMATOPROST 0.1 MG/ML
1 DROP INTO AFFECTED EYE IN THE EVENING SOLUTION/ DROPS OPHTHALMIC ONCE A DAY
Status: ACTIVE | COMMUNITY

## 2024-04-29 RX ORDER — MONTELUKAST SODIUM 10 MG/1
1 TABLET TABLET, FILM COATED ORAL ONCE A DAY
Status: ACTIVE | COMMUNITY

## 2024-04-29 RX ORDER — DICYCLOMINE HYDROCHLORIDE 20 MG/1
1 TABLET 30 MINUTES BEFORE MEALS TABLET ORAL THREE TIMES A DAY
Qty: 90 | Refills: 3 | Status: ACTIVE | COMMUNITY

## 2024-04-29 RX ORDER — FLUTICASONE FUROATE AND VILANTEROL TRIFENATATE 200; 25 UG/1; UG/1
1 PUFF POWDER RESPIRATORY (INHALATION) ONCE A DAY
Status: ACTIVE | COMMUNITY

## 2024-04-29 RX ORDER — MACITENTAN 10 MG/1
1 TABLET TABLET, FILM COATED ORAL ONCE A DAY
Status: ACTIVE | COMMUNITY

## 2024-04-29 RX ORDER — FERROUS FUMARATE 456(150)MG
1 TABLET TABLET ORAL EVERY OTHER DAY
Qty: 15 | Refills: 1 | Status: ACTIVE | COMMUNITY
Start: 2024-01-24

## 2024-04-29 RX ORDER — NEBIVOLOL HYDROCHLORIDE 5 MG/1
1 TABLET TABLET ORAL ONCE A DAY
Status: ACTIVE | COMMUNITY

## 2024-04-29 NOTE — HPI-TODAY'S VISIT:
62 y/o F with PMH of pulmnary sarcoidosis (Dr. Jorge Luis Reina), aspergilloma, chronic bronchiectasis, chronic hypoxic respiratory failure, severe pulmonary HTN on 2L home O2 PRN presenting in clinic for evaluation of dilated pancreatic duct.  11/2020 CT Abdomen showed: hepatic steatosis, mild distention of the extrahepatic duct, distention of the pancreatic duct likely secondary to prior cholecystectomy. Admits epigastric abdominal pain that relieves with belching/passing gas. Reports bloating, occasional nausea. Was given dicyclomine 20mg to help relieve pain and  Protonix 40mg qd to BID with releif and got CT done in 4/2023 with [facility] (Dr. Coty Ng) that showed the dilated duct again, recommended to get EUS.Reports 8 lbs unintentional weight loss in 2 months, occasional early satiety and night sweats. Denies diet changes, jaundice, icterus, anorexia, vomiting, steatorrhea, blood in stool. Reports occasional constipation but is fairly regular; fluctuates between soft and clumpy formed stools. States she went to the ER 1 month ago for hematemesis, was worked up and concluded this was due to her lung issues; brochoscopy scheduled tomorrow to assess for cause of bleeding wih Dr. Vidal Lee. Last colonoscopy: 2020 with Dr. Kennedy showed diverticula and internal hemorrhoids. Next due 2030. 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 Labs in 2020 had ALT 61, AST 51, alkP 95. 2023 labs did not assess LFTs. 1/22/24: Pt presenting for f/u. EUS scheduled for February. At last visit, increased pantoprazole 40mg from qd to BID. Labs for MASLD done only showed iron deficiency anemia; started on iron tablets and MiraLax. Denies constipation. States the bronchoscopy was negative. States the dicyclomine helped with the abdominal pain. States food takes a while to go down sometimes; noticed specifically with grits, mashed potatoes, meats. Bloating, nausea, belching has gotten better. 4/29/24: Pt had seen Dr. Kennedy on 4/5/24 for epigastric abdominal pain (ordered [facility])